# Patient Record
Sex: MALE | Race: WHITE | NOT HISPANIC OR LATINO | Employment: FULL TIME | URBAN - METROPOLITAN AREA
[De-identification: names, ages, dates, MRNs, and addresses within clinical notes are randomized per-mention and may not be internally consistent; named-entity substitution may affect disease eponyms.]

---

## 2017-06-21 ENCOUNTER — ALLSCRIPTS OFFICE VISIT (OUTPATIENT)
Dept: OTHER | Facility: OTHER | Age: 42
End: 2017-06-21

## 2017-06-21 DIAGNOSIS — Z13.6 ENCOUNTER FOR SCREENING FOR CARDIOVASCULAR DISORDERS: ICD-10-CM

## 2017-07-29 ENCOUNTER — TRANSCRIBE ORDERS (OUTPATIENT)
Dept: ADMINISTRATIVE | Facility: HOSPITAL | Age: 42
End: 2017-07-29

## 2017-07-29 ENCOUNTER — APPOINTMENT (OUTPATIENT)
Dept: LAB | Facility: HOSPITAL | Age: 42
End: 2017-07-29
Payer: COMMERCIAL

## 2017-07-29 DIAGNOSIS — Z13.6 ENCOUNTER FOR SCREENING FOR CARDIOVASCULAR DISORDERS: ICD-10-CM

## 2017-07-29 LAB
ALBUMIN SERPL BCP-MCNC: 3.7 G/DL (ref 3.5–5)
ALP SERPL-CCNC: 68 U/L (ref 46–116)
ALT SERPL W P-5'-P-CCNC: 26 U/L (ref 12–78)
ANION GAP SERPL CALCULATED.3IONS-SCNC: 10 MMOL/L (ref 4–13)
AST SERPL W P-5'-P-CCNC: 18 U/L (ref 5–45)
BILIRUB SERPL-MCNC: 0.5 MG/DL (ref 0.2–1)
BUN SERPL-MCNC: 19 MG/DL (ref 5–25)
CALCIUM SERPL-MCNC: 8.8 MG/DL (ref 8.3–10.1)
CHLORIDE SERPL-SCNC: 106 MMOL/L (ref 100–108)
CHOLEST SERPL-MCNC: 191 MG/DL (ref 50–200)
CO2 SERPL-SCNC: 27 MMOL/L (ref 21–32)
CREAT SERPL-MCNC: 0.98 MG/DL (ref 0.6–1.3)
GFR SERPL CREATININE-BSD FRML MDRD: 96 ML/MIN/1.73SQ M
GLUCOSE P FAST SERPL-MCNC: 94 MG/DL (ref 65–99)
HDLC SERPL-MCNC: 42 MG/DL (ref 40–60)
LDLC SERPL CALC-MCNC: 134 MG/DL (ref 0–100)
POTASSIUM SERPL-SCNC: 4.1 MMOL/L (ref 3.5–5.3)
PROT SERPL-MCNC: 7.3 G/DL (ref 6.4–8.2)
SODIUM SERPL-SCNC: 143 MMOL/L (ref 136–145)
TRIGL SERPL-MCNC: 74 MG/DL

## 2017-07-29 PROCEDURE — 80061 LIPID PANEL: CPT

## 2017-07-29 PROCEDURE — 36415 COLL VENOUS BLD VENIPUNCTURE: CPT

## 2017-07-29 PROCEDURE — 80053 COMPREHEN METABOLIC PANEL: CPT

## 2017-07-31 ENCOUNTER — GENERIC CONVERSION - ENCOUNTER (OUTPATIENT)
Dept: OTHER | Facility: OTHER | Age: 42
End: 2017-07-31

## 2018-01-11 NOTE — RESULT NOTES
Discussion/Summary   please call     labs are good but with some elevation of his LDL  advise improvement of diet and exercise  advise a low cholesterol diet  Verified Results  (1) LIPID PANEL, FASTING [id number] 07:44AM Mel Roth [place] Order Number: [id number]     Test Name Result Flag Reference   CHOLESTEROL 191 mg/dL     HDL,DIRECT 42 mg/dL  40-60   Specimen collection should occur prior to Metamizole administration due to the potential for falsely depressed results  LDL CHOLESTEROL CALCULATED 134 mg/dL H 0-100   Triglyceride:         Normal              <150 mg/dl       Borderline High    150-199 mg/dl       High               200-499 mg/dl       Very High          >499 mg/dl  Cholesterol:         Desirable        <200 mg/dl      Borderline High  200-239 mg/dl      High             >239 mg/dl  HDL Cholesterol:        High    >59 mg/dL      Low     <41 mg/dL  LDL CALCULATED:    This screening LDL is a calculated result  It does not have the accuracy of the Direct Measured LDL in the monitoring of patients with hyperlipidemia and/or statin therapy  Direct Measure LDL (AJR833) must be ordered separately in these patients  TRIGLYCERIDES 74 mg/dL  <=150   Specimen collection should occur prior to N-Acetylcysteine or Metamizole administration due to the potential for falsely depressed results          PT AWARE

## 2018-01-15 NOTE — PROGRESS NOTES
Assessment    1  Encounter for preventive health examination (V70 0) (Z00 00)    Plan  Health Maintenance    · Always use a seat belt and shoulder strap when riding or driving a motor vehicle ;  Status:Complete;   Done: 30VPO7135   · Brush your teeth 3 times a day and floss at least once a day ; Status:Complete;   Done:  14JPA4229   · Decreasing the stress in your life may help your condition improve ; Status:Complete;    Done: 15CQD8345   · Drink plenty of fluids ; Status:Complete;   Done: 95DLU1969   · Eat a normal well-balanced diet ; Status:Complete;   Done: 19BJK4093   · Limit your use of alcohol to 2 drinks or cans of beer a day ; Status:Complete;   Done:  96RGQ3337   · Regular aerobic exercise can help reduce stress ; Status:Complete;   Done: 10YNR1124   · There are many ways to reduce your risk of catching or spreading a sexually transmitted  Infection ; Status:Complete;   Done: 44ESZ0055   · Use a sun block product with an SPF of 15 or more ; Status:Complete;   Done:  54OZA4345   · We recommend routine visits to a dentist ; Status:Complete;   Done: 07UZT7161   · We recommend that you follow these rules for gun safety ; Status:Complete;   Done:  84WWQ3299   · We recommend that you follow these steps to lower your risk of osteoporosis  ;  Status:Complete;   Done: 24RTX4985   · Call [phone] if: You have any warning signs of skin cancer ; Status:Complete;    Done: 52LPW2225  Screening for cardiovascular condition    · (1) COMPREHENSIVE METABOLIC PANEL; Status:Active; Requested BQX:[id number];    · (1) LIPID PANEL, FASTING; Status:Active; Requested LEONIDAS:[id number];     Discussion/Summary  Impression: health maintenance visit  Currently, he eats an adequate diet and has an inadequate exercise regimen  Advice and education were given regarding nutrition, aerobic exercise, sunscreen use and seat belt use  Doing well    work on regular exercise  labs as outlined        Chief Complaint  Patient here for annual physical exam       History of Present Illness  HM, Adult Male: The patient is being seen for a health maintenance evaluation  General Health: The patient's health since the last visit is described as good  He does not have regular dental visits  He denies vision problems  He denies hearing loss  Immunizations status: up to date  Lifestyle:  He consumes a diverse and healthy diet  He does not exercise regularly  He does not use tobacco  He consumes alcohol  He reports occasional alcohol use  He denies drug use  Reproductive health:  the patient is sexually active  He denies erectile dysfunction  Screening:      Review of Systems    Constitutional: No fever or chills, feels well, no tiredness, no recent weight gain or weight loss  Eyes: No complaints of eye pain, no red eyes, no discharge from eyes, no itchy eyes  ENT: no complaints of earache, no hearing loss, no nosebleeds, no nasal discharge, no sore throat, no hoarseness  Cardiovascular: No complaints of slow heart rate, no fast heart rate, no chest pain, no palpitations, no leg claudication, no lower extremity  Respiratory: No complaints of shortness of breath, no wheezing, no cough, no SOB on exertion, no orthopnea or PND  Gastrointestinal: No complaints of abdominal pain, no constipation, no nausea or vomiting, no diarrhea or bloody stools  Genitourinary: No complaints of dysuria, no incontinence, no hesitancy, no nocturia, no genital lesion, no testicular pain  Musculoskeletal: No complaints of arthralgia, no myalgias, no joint swelling or stiffness, no limb pain or swelling  Active Problems    1  Need for Tdap vaccination (V06 1) (Z23)   2   Screening for cardiovascular condition (V81 2) (Z13 6)   3  Screening for endocrine, metabolic and immunity disorder (V77 99)   (Z13 29,Z13 0,Z13 228)    Past Medical History    · Denied: History of depression   · Denied: History of substance abuse   · Screening for cardiovascular condition (V81 2) (Z13 6)   · Screening for cardiovascular condition (V81 2) (Z13 6)   · Screening for endocrine, metabolic and immunity disorder (V77 99)  (Z13 29,Z13 0,Z13 228)    Family History  Mother    · Family history of Brain cancer (191 9) (C71 9)   · Family history of    · Denied: Family history of substance abuse   · Denied: Family history of Mental health problem  Father    · Denied: Family history of substance abuse   · Family history of HTN (hypertension) (401 9) (I10)   · Denied: Family history of Mental health problem    Social History    · Never a smoker   · No drug use   · Remote social alcohol use    Current Meds   1  No Reported Medications Recorded    Allergies    1  No Known Drug Allergies    Vitals   Recorded: 2017 06:40PM   Temperature 97 6 F   Heart Rate 64   Systolic 816   Diastolic 88   Height 5 ft 11 in   Weight 218 lb    BMI Calculated 30 41   BSA Calculated 2 19     Physical Exam    Constitutional   General appearance: No acute distress, well appearing and well nourished  Head and Face   Head and face: Normal     Palpation of the face and sinuses: No sinus tenderness  Eyes   Conjunctiva and lids: No erythema, swelling or discharge  Pupils and irises: Equal, round, reactive to light  Ears, Nose, Mouth, and Throat   External inspection of ears and nose: Normal     Otoscopic examination: Tympanic membranes translucent with normal light reflex  Canals patent without erythema  Oropharynx: Normal with no erythema, edema, exudate or lesions  Neck   Neck: Supple, symmetric, trachea midline, no masses  Thyroid: Normal, no thyromegaly  Pulmonary   Respiratory effort: No increased work of breathing or signs of respiratory distress  Palpation of chest: Normal     Auscultation of lungs: Clear to auscultation  Cardiovascular   Palpation of heart: Normal PMI, no thrills  Auscultation of heart: Normal rate and rhythm, normal S1 and S2, no murmurs      Pedal pulses: 2+ bilaterally  Peripheral vascular exam: Normal     Abdomen   Abdomen: Non-tender, no masses  Liver and spleen: No hepatomegaly or splenomegaly  Lymphatic   Palpation of lymph nodes in neck: No lymphadenopathy  Musculoskeletal   Gait and station: Normal     Inspection/palpation of digits and nails: Normal without clubbing or cyanosis  Inspection/palpation of joints, bones, and muscles: Normal     Range of motion: Normal     Stability: Normal     Neurologic   Cortical function: Normal mental status  Coordination: Normal finger to nose and heel to shin  Psychiatric   Orientation to person, place and time: Normal     Mood and affect: Normal        Results/Data  PHQ-2 Adult Depression Screening 21Jun2017 06:41PM User, [facility]     Test Name Result Flag Reference   PHQ-2 Adult Depression Score 0     Over the last two weeks, how often have you been bothered by any of the following problems?   Little interest or pleasure in doing things: Not at all - 0  Feeling down, depressed, or hopeless: Not at all - 0   PHQ-2 Adult Depression Screening Negative         Future Appointments    Date/Time Provider Specialty Site   06/27/2018 06:40 PM Devin Alejo MD [facility] Jazzy Reeves MD     Signatures   Electronically signed by : Sesar Tavares MD; Jun 21 2017  7:17PM EST                       (Author)

## 2018-01-18 NOTE — RESULT NOTES
Verified Results  (1) COMPREHENSIVE METABOLIC PANEL [id number] 26:93FA Carie Roth Order Number: [id number]  TW Order Number: [id number] Order Number: [id number]     Test Name Result Flag Reference   GLUCOSE,RANDM 97 mg/dL     If the patient is fasting, the ADA then defines impaired fasting glucose as > 100 mg/dL and diabetes as > or equal to 123 mg/dL  SODIUM 141 mmol/L  136-145   POTASSIUM 4 3 mmol/L  3 5-5 3   CHLORIDE 106 mmol/L  100-108   CARBON DIOXIDE 30 mmol/L  21-32   ANION GAP (CALC) 5 mmol/L  4-13   BLOOD UREA NITROGEN 18 mg/dL  5-25   CREATININE 0 90 mg/dL  0 60-1 30   Standardized to IDMS reference method   CALCIUM 8 7 mg/dL  8 3-10 1   BILI, TOTAL 0 60 mg/dL  0 20-1 00   ALK PHOSPHATAS 65 U/L     ALT (SGPT) 21 U/L  12-78   AST(SGOT) 17 U/L  5-45   ALBUMIN 3 7 g/dL  3 5-5 0   TOTAL PROTEIN 7 0 g/dL  6 4-8 2   eGFR Non-African American      >60 0 ml/min/1 73sq [facility] Disease Education Program recommendations are as follows:  GFR calculation is accurate only with a steady state creatinine  Chronic Kidney disease less than 60 ml/min/1 73 sq  meters  Kidney failure less than 15 ml/min/1 73 sq  meters  (1) LIPID PANEL, FASTING [id number] 07:14AM Carie Roth [place] Order Number: [id number]   Order Number: [id number] Order Number: [id number]     Test Name Result Flag Reference   CHOLESTEROL 177 mg/dL     HDL,DIRECT 49 mg/dL  40-60   Specimen collection should occur prior to Metamizole administration due to the potential for falsely depressed results     LDL CHOLESTEROL CALCULATED 120 mg/dL H 0-100   Triglyceride:         Normal              <150 mg/dl       Borderline High    150-199 mg/dl       High               200-499 mg/dl       Very High          >499 mg/dl  Cholesterol:         Desirable        <200 mg/dl      Borderline High  200-239 mg/dl      High             >239 mg/dl  HDL Cholesterol:        High    >59 mg/dL      Low     <41 mg/dL  LDL CALCULATED:    This screening LDL is a calculated result  It does not have the accuracy of the Direct Measured LDL in the monitoring of patients with hyperlipidemia and/or statin therapy  Direct Measure LDL (DAM903) must be ordered separately in these patients  TRIGLYCERIDES 38 mg/dL  <=150   Specimen collection should occur prior to N-Acetylcysteine or Metamizole administration due to the potential for falsely depressed results  Pt aware  1      1 Amended By: Peter Rosales; May 31 2016 2:58 PM EST    Discussion/Summary   please call    labs are good except slightly elevated choelesterol with LDL at 120  recommend improvement of diet and exercise     [facility]

## 2018-01-22 VITALS
HEIGHT: 71 IN | HEART RATE: 64 BPM | TEMPERATURE: 97.6 F | SYSTOLIC BLOOD PRESSURE: 122 MMHG | BODY MASS INDEX: 30.52 KG/M2 | DIASTOLIC BLOOD PRESSURE: 88 MMHG | WEIGHT: 218 LBS

## 2018-06-27 ENCOUNTER — OFFICE VISIT (OUTPATIENT)
Dept: FAMILY MEDICINE CLINIC | Facility: HOSPITAL | Age: 43
End: 2018-06-27
Payer: COMMERCIAL

## 2018-06-27 VITALS
WEIGHT: 216.6 LBS | TEMPERATURE: 97.9 F | SYSTOLIC BLOOD PRESSURE: 124 MMHG | HEIGHT: 71 IN | BODY MASS INDEX: 30.32 KG/M2 | HEART RATE: 58 BPM | DIASTOLIC BLOOD PRESSURE: 78 MMHG

## 2018-06-27 DIAGNOSIS — Z13.6 SCREENING FOR CARDIOVASCULAR CONDITION: ICD-10-CM

## 2018-06-27 DIAGNOSIS — Z00.00 ENCOUNTER FOR WELLNESS EXAMINATION IN ADULT: Primary | ICD-10-CM

## 2018-06-27 DIAGNOSIS — Z11.4 SCREENING FOR HIV WITHOUT PRESENCE OF RISK FACTORS: ICD-10-CM

## 2018-06-27 PROCEDURE — 99386 PREV VISIT NEW AGE 40-64: CPT | Performed by: FAMILY MEDICINE

## 2018-06-27 NOTE — PROGRESS NOTES
Patient is here for a Physical        Problem List Items Addressed This Visit     None      Visit Diagnoses     Encounter for wellness examination in adult    -  Primary    Screening for cardiovascular condition        Relevant Orders    CBC    Comprehensive metabolic panel    Lipid panel    Screening for HIV without presence of risk factors        Relevant Orders    Rapid HIV 1/2 AB-AG Combo          Doing well  Update screening labs as outlined  Advise dental visit  No Follow-up on file  Here for a PHYSICAL:    Patient with no new concerns  Has not been to the dentist in about 4 years  Nose his diet could be better  His exercise regimen is very minimal     Health Maintenance   Topic Date Due    HIV SCREENING  1975    Depression Screening PHQ-9  1975    INFLUENZA VACCINE  09/01/2018    DTaP,Tdap,and Td Vaccines (2 - Td) 05/11/2026     Health Maintenance Due   Topic Date Due    HIV SCREENING  1975    Depression Screening PHQ-9  1975       Review of Systems   Constitutional: Negative  Negative for activity change, appetite change, chills and diaphoresis  HENT: Negative for congestion and dental problem  Respiratory: Negative  Negative for apnea, chest tightness, shortness of breath and wheezing  Cardiovascular: Negative  Negative for chest pain, palpitations and leg swelling  Gastrointestinal: Negative  Negative for abdominal distention, abdominal pain, constipation, diarrhea and nausea  Genitourinary: Negative  Negative for difficulty urinating, dysuria and frequency  Social History     Social History    Marital status: Single     Spouse name: N/A    Number of children: N/A    Years of education: N/A     Occupational History    Not on file       Social History Main Topics    Smoking status: Never Smoker    Smokeless tobacco: Never Used    Alcohol use Yes      Comment: remote social alcohol use    Drug use: No    Sexual activity: Not on file     Other Topics Concern    Not on file     Social History Narrative    No narrative on file     family history includes Brain cancer in his mother; Hypertension in his father      has no past medical history of Depression or Substance abuse  No Known Allergies    Immunization History   Administered Date(s) Administered    Tdap 05/11/2016       Vitals:    06/27/18 1835   BP: 124/78   Pulse: 58   Temp: 97 9 °F (36 6 °C)   Body mass index is 30 21 kg/m²  Physical Exam   Constitutional: He is oriented to person, place, and time  He appears well-developed and well-nourished  HENT:   Head: Normocephalic and atraumatic  Eyes: EOM are normal  Pupils are equal, round, and reactive to light  Neck: Normal range of motion  Neck supple  Cardiovascular: Normal rate, regular rhythm and normal heart sounds  Pulmonary/Chest: Effort normal and breath sounds normal    Abdominal: Soft  Bowel sounds are normal    Neurological: He is alert and oriented to person, place, and time  Skin: Skin is warm and dry  Psychiatric: He has a normal mood and affect  His behavior is normal    Nursing note and vitals reviewed        Recent Results (from the past 8736 hour(s))   Comprehensive metabolic panel    Collection Time: 07/29/17  7:44 AM   Result Value Ref Range    Sodium 143 136 - 145 mmol/L    Potassium 4 1 3 5 - 5 3 mmol/L    Chloride 106 100 - 108 mmol/L    CO2 27 21 - 32 mmol/L    Anion Gap 10 4 - 13 mmol/L    BUN 19 5 - 25 mg/dL    Creatinine 0 98 0 60 - 1 30 mg/dL    Glucose, Fasting 94 65 - 99 mg/dL    Calcium 8 8 8 3 - 10 1 mg/dL    AST 18 5 - 45 U/L    ALT 26 12 - 78 U/L    Alkaline Phosphatase 68 46 - 116 U/L    Total Protein 7 3 6 4 - 8 2 g/dL    Albumin 3 7 3 5 - 5 0 g/dL    Total Bilirubin 0 50 0 20 - 1 00 mg/dL    eGFR 96 ml/min/1 73sq m   Lipid panel    Collection Time: 07/29/17  7:44 AM   Result Value Ref Range    Cholesterol 191 50 - 200 mg/dL    Triglycerides 74 <=150 mg/dL    HDL, Direct 42 40 - 60 mg/dL    LDL Calculated 134 (H) 0 - 100 mg/dL

## 2018-06-27 NOTE — PATIENT INSTRUCTIONS

## 2018-08-25 ENCOUNTER — APPOINTMENT (OUTPATIENT)
Dept: LAB | Facility: HOSPITAL | Age: 43
End: 2018-08-25
Payer: COMMERCIAL

## 2018-08-25 DIAGNOSIS — Z13.6 SCREENING FOR CARDIOVASCULAR CONDITION: ICD-10-CM

## 2018-08-25 DIAGNOSIS — Z11.4 SCREENING FOR HIV WITHOUT PRESENCE OF RISK FACTORS: ICD-10-CM

## 2018-08-25 LAB
ALBUMIN SERPL BCP-MCNC: 3.5 G/DL (ref 3.5–5)
ALP SERPL-CCNC: 65 U/L (ref 46–116)
ALT SERPL W P-5'-P-CCNC: 21 U/L (ref 12–78)
ANION GAP SERPL CALCULATED.3IONS-SCNC: 6 MMOL/L (ref 4–13)
AST SERPL W P-5'-P-CCNC: 15 U/L (ref 5–45)
BILIRUB SERPL-MCNC: 0.4 MG/DL (ref 0.2–1)
BUN SERPL-MCNC: 21 MG/DL (ref 5–25)
CALCIUM SERPL-MCNC: 8.7 MG/DL (ref 8.3–10.1)
CHLORIDE SERPL-SCNC: 107 MMOL/L (ref 100–108)
CHOLEST SERPL-MCNC: 170 MG/DL (ref 50–200)
CO2 SERPL-SCNC: 28 MMOL/L (ref 21–32)
CREAT SERPL-MCNC: 1 MG/DL (ref 0.6–1.3)
ERYTHROCYTE [DISTWIDTH] IN BLOOD BY AUTOMATED COUNT: 13.5 % (ref 11.6–15.1)
GFR SERPL CREATININE-BSD FRML MDRD: 93 ML/MIN/1.73SQ M
GLUCOSE P FAST SERPL-MCNC: 95 MG/DL (ref 65–99)
HCT VFR BLD AUTO: 40.7 % (ref 36.5–49.3)
HDLC SERPL-MCNC: 48 MG/DL (ref 40–60)
HGB BLD-MCNC: 13.7 G/DL (ref 12–17)
LDLC SERPL CALC-MCNC: 113 MG/DL (ref 0–100)
MCH RBC QN AUTO: 30.2 PG (ref 26.8–34.3)
MCHC RBC AUTO-ENTMCNC: 33.7 G/DL (ref 31.4–37.4)
MCV RBC AUTO: 90 FL (ref 82–98)
NONHDLC SERPL-MCNC: 122 MG/DL
PLATELET # BLD AUTO: 317 THOUSANDS/UL (ref 149–390)
PMV BLD AUTO: 9.4 FL (ref 8.9–12.7)
POTASSIUM SERPL-SCNC: 3.9 MMOL/L (ref 3.5–5.3)
PROT SERPL-MCNC: 7.1 G/DL (ref 6.4–8.2)
RBC # BLD AUTO: 4.54 MILLION/UL (ref 3.88–5.62)
SODIUM SERPL-SCNC: 141 MMOL/L (ref 136–145)
TRIGL SERPL-MCNC: 45 MG/DL
WBC # BLD AUTO: 7.35 THOUSAND/UL (ref 4.31–10.16)

## 2018-08-25 PROCEDURE — 85027 COMPLETE CBC AUTOMATED: CPT

## 2018-08-25 PROCEDURE — 36415 COLL VENOUS BLD VENIPUNCTURE: CPT

## 2018-08-25 PROCEDURE — 80053 COMPREHEN METABOLIC PANEL: CPT

## 2018-08-25 PROCEDURE — 80061 LIPID PANEL: CPT

## 2018-08-25 PROCEDURE — 87389 HIV-1 AG W/HIV-1&-2 AB AG IA: CPT

## 2018-08-27 LAB — HIV 1+2 AB+HIV1 P24 AG SERPL QL IA: NORMAL

## 2019-07-09 ENCOUNTER — TELEPHONE (OUTPATIENT)
Dept: FAMILY MEDICINE CLINIC | Facility: HOSPITAL | Age: 44
End: 2019-07-09

## 2019-11-20 ENCOUNTER — OFFICE VISIT (OUTPATIENT)
Dept: FAMILY MEDICINE CLINIC | Facility: HOSPITAL | Age: 44
End: 2019-11-20
Payer: COMMERCIAL

## 2019-11-20 VITALS
TEMPERATURE: 98.3 F | BODY MASS INDEX: 30.8 KG/M2 | HEART RATE: 72 BPM | DIASTOLIC BLOOD PRESSURE: 80 MMHG | HEIGHT: 71 IN | WEIGHT: 220 LBS | SYSTOLIC BLOOD PRESSURE: 122 MMHG

## 2019-11-20 DIAGNOSIS — Z13.6 SCREENING FOR CARDIOVASCULAR CONDITION: ICD-10-CM

## 2019-11-20 DIAGNOSIS — E66.09 CLASS 1 OBESITY DUE TO EXCESS CALORIES WITHOUT SERIOUS COMORBIDITY WITH BODY MASS INDEX (BMI) OF 31.0 TO 31.9 IN ADULT: ICD-10-CM

## 2019-11-20 DIAGNOSIS — Z13.1 SCREENING FOR DIABETES MELLITUS (DM): ICD-10-CM

## 2019-11-20 DIAGNOSIS — Z23 ENCOUNTER FOR IMMUNIZATION: ICD-10-CM

## 2019-11-20 DIAGNOSIS — Z00.00 ANNUAL PHYSICAL EXAM: Primary | ICD-10-CM

## 2019-11-20 PROCEDURE — 90686 IIV4 VACC NO PRSV 0.5 ML IM: CPT | Performed by: FAMILY MEDICINE

## 2019-11-20 PROCEDURE — 99396 PREV VISIT EST AGE 40-64: CPT | Performed by: FAMILY MEDICINE

## 2019-11-20 PROCEDURE — 90471 IMMUNIZATION ADMIN: CPT | Performed by: FAMILY MEDICINE

## 2019-11-21 NOTE — PROGRESS NOTES
[facility] MD    NAME: Ba Carrera  AGE: 45 y o  SEX: male  : 1975     DATE: 2019     Assessment and Plan:     Problem List Items Addressed This Visit     None      Visit Diagnoses     Annual physical exam    -  Primary    Encounter for immunization        Relevant Orders    influenza vaccine, 5610-2936, quadrivalent, 0 5 mL, preservative-free, for adult and pediatric patients 6 mos+ (AFLURIA, FLUARIX, FLULAVAL, FLUZONE) (Completed)    Class 1 obesity due to excess calories without serious comorbidity with body mass index (BMI) of 31 0 to 31 9 in adult        Screening for diabetes mellitus (DM)        Relevant Orders    CBC    Comprehensive metabolic panel    Lipid Panel with Direct LDL reflex    Hemoglobin A1C    TSH, 3rd generation with Free T4 reflex    Magnesium    Screening for cardiovascular condition        Relevant Orders    Lipid Panel with Direct LDL reflex          Immunizations and preventive care screenings were discussed with patient today  Appropriate education was printed on patient's after visit summary  Counseling:  Alcohol/drug use: discussed moderation in alcohol intake, the recommendations for healthy alcohol use, and avoidance of illicit drug use  Dental Health: discussed importance of regular tooth brushing, flossing, and dental visits  · Exercise: the importance of regular exercise/physical activity was discussed  Recommend exercise 3-5 times per week for at least 30 minutes  No follow-ups on file  Chief Complaint:     Chief Complaint   Patient presents with    Annual Exam      History of Present Illness:     Adult Annual Physical   Patient here for a comprehensive physical exam  The patient reports no problems  Diet and Physical Activity  · Diet/Nutrition: well balanced diet  · Exercise: no formal exercise        Depression Screening  PHQ-9 Depression Screening    PHQ-9: Frequency of the following problems over the past two weeks:       Little interest or pleasure in doing things:  0 - not at all  Feeling down, depressed, or hopeless:  0 - not at all  PHQ-2 Score:  0       General Health  · Sleep: sleeps well  · Hearing: normal - bilateral   · Vision: no vision problems  · Dental: no dental visits for >1 year   Health  · Symptoms include: none     Review of Systems:     Review of Systems   Constitutional: Negative  Negative for activity change, appetite change, chills and diaphoresis  HENT: Negative for congestion and dental problem  Respiratory: Negative  Negative for apnea, chest tightness, shortness of breath and wheezing  Cardiovascular: Negative  Negative for chest pain, palpitations and leg swelling  Gastrointestinal: Negative  Negative for abdominal distention, abdominal pain, constipation, diarrhea and nausea  Genitourinary: Negative  Negative for difficulty urinating, dysuria and frequency  Past Medical History:     History reviewed  No pertinent past medical history  Past Surgical History:     History reviewed  No pertinent surgical history     Family History:     Family History   Problem Relation Age of Onset    Brain cancer Mother     Hypertension Father       Social History:     Social History     Socioeconomic History    Marital status: Single     Spouse name: None    Number of children: None    Years of education: None    Highest education level: None   Occupational History    None   Social Needs    Financial resource strain: None    Food insecurity:     Worry: None     Inability: None    Transportation needs:     Medical: None     Non-medical: None   Tobacco Use    Smoking status: Never Smoker    Smokeless tobacco: Never Used   Substance and Sexual Activity    Alcohol use: Yes     Comment: remote social alcohol use    Drug use: No    Sexual activity: None   Lifestyle    Physical activity:     Days per week: None Minutes per session: None    Stress: None   Relationships    Social connections:     Talks on phone: None     Gets together: None     Attends Yazidism service: None     Active member of club or organization: None     Attends meetings of clubs or organizations: None     Relationship status: None    Intimate partner violence:     Fear of current or ex partner: None     Emotionally abused: None     Physically abused: None     Forced sexual activity: None   Other Topics Concern    None   Social History Narrative    None      Current Medications:     No current outpatient medications on file  No current facility-administered medications for this visit  Allergies:     No Known Allergies   Physical Exam:     /80   Pulse 72   Temp 98 3 °F (36 8 °C)   Ht 5' 10 5" (1 791 m)   Wt 99 8 kg (220 lb)   BMI 31 12 kg/m²     Physical Exam   Constitutional: He is oriented to person, place, and time  He appears well-developed and well-nourished  No distress  HENT:   Head: Normocephalic and atraumatic  Right Ear: External ear normal    Left Ear: External ear normal    Nose: Nose normal    Mouth/Throat: Oropharynx is clear and moist    Eyes: Pupils are equal, round, and reactive to light  Conjunctivae and EOM are normal    Neck: Normal range of motion  Neck supple  Cardiovascular: Normal rate, regular rhythm and normal heart sounds  Exam reveals no gallop and no friction rub  No murmur heard  Pulmonary/Chest: Effort normal and breath sounds normal  No respiratory distress  He has no wheezes  He has no rales  He exhibits no tenderness  Abdominal: Soft  Bowel sounds are normal  He exhibits no distension and no mass  There is no tenderness  There is no rebound and no guarding  Musculoskeletal: Normal range of motion  Neurological: He is alert and oriented to person, place, and time  Skin: Skin is warm  Psychiatric: He has a normal mood and affect   His behavior is normal  Judgment and thought content normal    Nursing note and vitals reviewed        MD Elvin Schmitt MD

## 2019-11-21 NOTE — PROGRESS NOTES
Assessment/Plan:      Problem List Items Addressed This Visit     None      Visit Diagnoses     Encounter for immunization    -  Primary    Relevant Orders    influenza vaccine, [id number], quadrivalent, 0 5 mL, preservative-free, for adult and pediatric patients 6 mos+ (AFLURIA, FLUARIX, FLULAVAL, FLUZONE) (Completed)                     Subjective:   Chief Complaint   Patient presents with    Annual Exam        Patient ID: Malissa Leger  is a 45 y o  male  HPI      The following portions of the patient's history were reviewed and updated as appropriate: allergies, current medications, past family history, past medical history, past social history, past surgical history and problem list     Review of Systems      Objective:  Vitals:    11/20/19 1849   BP: 122/80   Pulse: 72   Temp: 98 3 °F (36 8 °C)   Weight: 99 8 kg (220 lb)   Height: 5' 10 5" (1 791 m)     BP Readings from Last 3 Encounters:   11/20/19 122/80   06/27/18 124/78   06/21/17 122/88      Wt Readings from Last 3 Encounters:   11/20/19 99 8 kg (220 lb)   06/27/18 98 2 kg (216 lb 9 6 oz)   06/21/17 98 9 kg (218 lb)        No visits with results within 6 Month(s) from this visit     Latest known visit with results is:   Appointment on 08/25/2018   Component Date Value Ref Range Status    WBC 08/25/2018 7 35  4 31 - 10 16 Thousand/uL Final    RBC 08/25/2018 4 54  3 88 - 5 62 Million/uL Final    Hemoglobin 08/25/2018 13 7  12 0 - 17 0 g/dL Final    Hematocrit 08/25/2018 40 7  36 5 - 49 3 % Final    MCV 08/25/2018 90  82 - 98 fL Final    MCH 08/25/2018 30 2  26 8 - 34 3 pg Final    MCHC 08/25/2018 33 7  31 4 - 37 4 g/dL Final    RDW 08/25/2018 13 5  11 6 - 15 1 % Final    Platelets 21/84/4475 317  149 - 390 Thousands/uL Final    MPV 08/25/2018 9 4  8 9 - 12 7 fL Final    Sodium 08/25/2018 141  136 - 145 mmol/L Final    Potassium 08/25/2018 3 9  3 5 - 5 3 mmol/L Final    Chloride 08/25/2018 107  100 - 108 mmol/L Final    CO2 08/25/2018 28  21 - 32 mmol/L Final    ANION GAP 08/25/2018 6  4 - 13 mmol/L Final    BUN 08/25/2018 21  5 - 25 mg/dL Final    Creatinine 08/25/2018 1 00  0 60 - 1 30 mg/dL Final    Standardized to IDMS reference method    Glucose, Fasting 08/25/2018 95  65 - 99 mg/dL Final      Specimen collection should occur prior to Sulfasalazine administration due to the potential for falsely depressed results  Specimen collection should occur prior to Sulfapyridine administration due to the potential for falsely elevated results   Calcium 08/25/2018 8 7  8 3 - 10 1 mg/dL Final    AST 08/25/2018 15  5 - 45 U/L Final      Specimen collection should occur prior to Sulfasalazine administration due to the potential for falsely depressed results   ALT 08/25/2018 21  12 - 78 U/L Final      Specimen collection should occur prior to Sulfasalazine administration due to the potential for falsely depressed results   Alkaline Phosphatase 08/25/2018 65  46 - 116 U/L Final    Total Protein 08/25/2018 7 1  6 4 - 8 2 g/dL Final    Albumin 08/25/2018 3 5  3 5 - 5 0 g/dL Final    Total Bilirubin 08/25/2018 0 40  0 20 - 1 00 mg/dL Final    eGFR 08/25/2018 93  ml/min/1 73sq m Final    Cholesterol 08/25/2018 170  50 - 200 mg/dL Final      Cholesterol:       Desirable         <200 mg/dl       Borderline         200-239 mg/dl       High              >239           Triglycerides 08/25/2018 45  <=150 mg/dL Final      Triglyceride:     Normal          <150 mg/dl     Borderline High 150-199 mg/dl     High            200-499 mg/dl        Very High       >499 mg/dl    Specimen collection should occur prior to N-Acetylcysteine or Metamizole administration due to the potential for falsely depressed results   HDL, Direct 08/25/2018 48  40 - 60 mg/dL Final      HDL Cholesterol:       High    >60 mg/dL       Low     <41 mg/dL  Specimen collection should occur prior to Metamizole administration due to the potential for falsley depressed results   LDL Calculated 08/25/2018 113* 0 - 100 mg/dL Final      LDL Cholesterol:     Optimal           <100 mg/dl     Near Optimal      100-129 mg/dl     Above Optimal       Borderline High 130-159 mg/dl       High            160-189 mg/dl       Very High       >189 mg/dl         This screening LDL is a calculated result  It does not have the accuracy of the Direct Measured LDL in the monitoring of patients with hyperlipidemia and/or statin therapy  Direct Measure LDL ([id number]) must be ordered separately in these patients      Non-HDL-Chol (CHOL-HDL) 08/25/2018 122  mg/dl Final    HIV-1/HIV-2 Ab 08/25/2018 Non-Reactive  Non-Reactive Final   ]       Physical Exam

## 2021-05-04 ENCOUNTER — TELEPHONE (OUTPATIENT)
Dept: FAMILY MEDICINE CLINIC | Facility: HOSPITAL | Age: 46
End: 2021-05-04

## 2025-02-10 ENCOUNTER — APPOINTMENT (OUTPATIENT)
Dept: URGENT CARE | Facility: CLINIC | Age: 50
End: 2025-02-10

## 2025-05-28 ENCOUNTER — TELEPHONE (OUTPATIENT)
Dept: FAMILY MEDICINE CLINIC | Facility: HOSPITAL | Age: 50
End: 2025-05-28

## 2025-05-28 NOTE — LETTER
[facility] [address]   [address]  [address] [address]    Date: 06/03/25    Abdi Fry Jr.  [address] [facility] [place] [address]    Dear Abdi:    Your primary care provider, Andres Roth MD, [facility] [address] , is committed to providing you with quality health care and we consider it a privilege to be your health care provider.  We have not seen you in the office since 11/20/2019 and have been attempting to contact you to schedule your annual physical.  Your primary care provider wants to ensure your ongoing medical care is being addressed.  Please call the office [phone] to re-establish care and schedule your annual physical today.  Yearly physicals are a key component in maintaining good health.  If you have established care with a different primary care provider, please call our office to notify us of this change.  We encourage you to call the number on the back of your insurance card to change your primary care physician with your insurance company as well.   We thank you for choosing [facility] for your healthcare needs.  Sincerely,    [facility] [address]   [phone]

## 2025-06-03 NOTE — TELEPHONE ENCOUNTER
Lm for cb  
Patient last seen 11/20/19.      Please attempt one call to confirm if patient still with the practice.  If so pls schedule PE appt.    Please return message to Thuy to complete patient attribution process.  
[facility] letter mailed 6/3/25  
predicted duration of therapy intervention/anticipated equipment needs at discharge/functional limitations in following categories/anticipated discharge recommendation/rehab potential/impairments found/therapy frequency/risk reduction/prevention

## 2025-07-07 ENCOUNTER — DOCUMENTATION (OUTPATIENT)
Dept: ADMINISTRATIVE | Facility: OTHER | Age: 50
End: 2025-07-07